# Patient Record
(demographics unavailable — no encounter records)

---

## 2025-01-29 NOTE — REVIEW OF SYSTEMS
[Fever] : fever [Chills] : chills [Fatigue] : fatigue [Abdominal Pain] : abdominal pain [Nausea] : nausea [Vomiting] : vomiting [Negative] : Heme/Lymph [Diarrhea] : diarrhea

## 2025-01-29 NOTE — HISTORY OF PRESENT ILLNESS
[FreeTextEntry8] : Ms. BOB TORREZ is a 38-year-old female with history of anxiety and dyspepsia presenting today for acute visit. [place] sick after coming back from [place] last week. Sx started Tuesday last week and improved Monday this week. Low grade fever. +HA. No diarrhea. +Nausea, one episode of emesis. Felt lightheaded. Friend had similar symptoms but only last about 24 hours. Has had to call out from work.

## 2025-01-29 NOTE — PHYSICAL EXAM
[No Acute Distress] : no acute distress [Well-Appearing] : well-appearing [Normal Sclera/Conjunctiva] : normal sclera/conjunctiva [Normal Outer Ear/Nose] : the outer ears and nose were normal in appearance [No Edema] : there was no peripheral edema [Soft] : abdomen soft [Non Tender] : non-tender [Non-distended] : non-distended [No Masses] : no abdominal mass palpated [Normal Bowel Sounds] : normal bowel sounds [Normal] : affect was normal and insight and judgment were intact

## 2025-04-15 NOTE — HISTORY OF PRESENT ILLNESS
[FreeTextEntry1] : "I think I have an autoimmune disease" [de-identified] : Ms. BOB TORREZ is a 38-year-old female with history of anxiety and dyspepsia presenting today for CPE. She has been dealing with WHITT and dry mouth/eyes. Dealing with valvar pain. She also endorses WHITT with associated palpitations for the past two months. She feels her symptoms all started following her recent GI illness.

## 2025-04-15 NOTE — ASSESSMENT
[Vaccines Reviewed] : Immunizations reviewed today. Please see immunization details in the vaccine log within the immunization flowsheet.  [FreeTextEntry1] : #HCM - discussed healthy diet and exercise - discussed age appropriate cancer screenings and vaccines - recommend Tdap, declines today - due for annual GYN visit.  - labs drawn in office today. -  ecg obtained for diagnosis and management of the assessed problem(s) - discussed risks/benefits of HPV vaccine. Rx sent to pharmacy

## 2025-04-15 NOTE — HEALTH RISK ASSESSMENT
[Yes] : Yes [2 - 4 times a month (2 pts)] : 2-4 times a month (2 points) [1 or 2 (0 pts)] : 1 or 2 (0 points) [Never (0 pts)] : Never (0 points) [No] : In the past 12 months have you used drugs other than those required for medical reasons? No [Never] : Never [Audit-CScore] : 2 [de-identified] : limited by WHITT [de-identified] : healthy diet

## 2025-04-15 NOTE — REVIEW OF SYSTEMS
[Fatigue] : fatigue [Dryness] : dryness  [Negative] : Heme/Lymph [Fever] : no fever [Chills] : no chills [Chest Pain] : no chest pain [Palpitations] : palpitations [Leg Claudication] : no leg claudication [Orthopnea] : no orthopnea [Paroxysmal Nocturnal Dyspnea] : no paroxysmal nocturnal dyspnea [Wheezing] : no wheezing [Cough] : no cough [Dyspnea on Exertion] : dyspnea on exertion

## 2025-04-15 NOTE — PHYSICAL EXAM
[No Acute Distress] : no acute distress [Well-Appearing] : well-appearing [No Edema] : there was no peripheral edema [Soft] : abdomen soft [Non Tender] : non-tender [Non-distended] : non-distended [No Masses] : no abdominal mass palpated [Normal Bowel Sounds] : normal bowel sounds [Normal] : affect was normal and insight and judgment were intact [Normal Rate] : normal rate  [Regular Rhythm] : with a regular rhythm [de-identified] : + murmur

## 2025-05-06 NOTE — PHYSICAL EXAM
[No Acute Distress] : no acute distress [Well-Appearing] : well-appearing [Normal Rate] : normal rate  [Regular Rhythm] : with a regular rhythm [No Edema] : there was no peripheral edema [Soft] : abdomen soft [Non Tender] : non-tender [Normal Bowel Sounds] : normal bowel sounds [Normal] : affect was normal and insight and judgment were intact

## 2025-05-06 NOTE — HISTORY OF PRESENT ILLNESS
[FreeTextEntry8] : Ms. BOB TORREZ is a 38-year-old female with history of anxiety and dyspepsia presenting today for acute visit. Labs at her recent CPE showed severe iron deficiency anemia. She went to the ED was given pRBCs + IV iron. She returned to the ED 5/2 with R thigh pain and duplex showed peroneal DVT. No above the knee DVT. Pelvic sono showed large fibroid. Treated with IV iron. Per ED notes she was given Eliquis. She has been taking Eliquis starter pack as prescribed. Denies any bleeding issues but her menstrual period is likely to start today. Reports she has been feeling much better since the blood transfusion.  Heading to [place] for a cruise next week.

## 2025-06-24 NOTE — PLAN
[FreeTextEntry1] : 38 yo  with LMP 6/6 presents for annual exam and consultation for fibroids. - Pap UTD though patient requesting due to history of HPV; f/u results - Mammogram due next year - STD testing declined - Contraception declined - Recommend follow up with medicine for DVT surveillance and or prophylaxis - Regarding fibroids, patient experiencing chronic anemia requiring transfusions due to AUB-L; patient requesting surgical management - Discussed surgical management with myomectomy (uterine-sparing) vs hysterectomy; patient desires future child bearing capability - Discussed medical management of fibroids and limitations given history of DVT including use of Lng-IUD, leuprolide w/o add back therapy, and possibly ulipristal acetate (though use is not common in [place]) - Patient to follow up in GYN booking clinic  GL PGY2 Evaluated with attending Dr Domínguez

## 2025-06-24 NOTE — PLAN
[FreeTextEntry1] : 40 yo  with LMP 6/6 presents for annual exam and consultation for fibroids. - Pap UTD though patient requesting due to history of HPV; f/u results - Mammogram due next year - STD testing declined - Contraception declined - Recommend follow up with medicine for DVT surveillance and or prophylaxis - Regarding fibroids, patient experiencing chronic anemia requiring transfusions due to AUB-L; patient requesting surgical management - Discussed surgical management with myomectomy (uterine-sparing) vs hysterectomy; patient desires future child bearing capability - Discussed medical management of fibroids and limitations given history of DVT including use of Lng-IUD, leuprolide w/o add back therapy, and possibly ulipristal acetate (though use is not common in [place]) - Patient to follow up in GYN booking clinic  GL PGY2 Evaluated with attending Dr Domínguez

## 2025-06-24 NOTE — PHYSICAL EXAM
[Chaperoned Physical Exam] : A chaperone was present in the examining room during all aspects of the physical examination. [MA] : MA [Labia Majora] : normal [Labia Minora] : normal [Pink Rugae] : pink rugae [Normal] : normal [Enlarged ___ wks] : enlarged [unfilled] ~Uweeks [Uterine Adnexae] : non-palpable [Tenderness] : nontender

## 2025-06-24 NOTE — HISTORY OF PRESENT ILLNESS
[Patient reported PAP Smear was normal] : Patient reported PAP Smear was normal [Y] : Positive pregnancy history [Normal Amount/Duration] :  normal amount and duration [Regular Cycle Intervals] : periods have been regular [Menarche Age: ____] : age at menarche was [unfilled] [Currently Active] : currently active [PapSmeardate] : 1/26/2024 [LMPDate] : 6/6/2025 [MensesFreq] : 28 [MensesLength] : 8 [PGHxTotal] : 3 [[facility]] : 2 [PGHxABSpont] : 1 [FreeTextEntry1] : 6/6/2025

## 2025-07-03 NOTE — PHYSICAL EXAM
[No Acute Distress] : no acute distress [Well-Appearing] : well-appearing [Normal Sclera/Conjunctiva] : normal sclera/conjunctiva [Normal Outer Ear/Nose] : the outer ears and nose were normal in appearance [No Respiratory Distress] : no respiratory distress  [No Accessory Muscle Use] : no accessory muscle use [Normal Rate] : normal rate  [No Edema] : there was no peripheral edema [Normal] : affect was normal and insight and judgment were intact

## 2025-07-03 NOTE — HISTORY OF PRESENT ILLNESS
[de-identified] : Ms. BOB TORREZ is a 39-year-old female with history of DVT, fibroids c/b DAO, anxiety and dyspepsia presenting today for follow up visit. She is pending GYN surgery consult next week for possible myomectomy.  She is concerned her leg felt uncomfortable/warm when she woke up this morning.

## 2025-07-03 NOTE — PLAN
[FreeTextEntry1] : will discuss on-call MD in advance given history of prior critical hemoglobin on labs even when asymptomatic